# Patient Record
Sex: FEMALE | Race: WHITE | NOT HISPANIC OR LATINO | Employment: FULL TIME | URBAN - METROPOLITAN AREA
[De-identification: names, ages, dates, MRNs, and addresses within clinical notes are randomized per-mention and may not be internally consistent; named-entity substitution may affect disease eponyms.]

---

## 2017-11-02 ENCOUNTER — OFFICE VISIT (OUTPATIENT)
Dept: ORTHOPEDIC SURGERY | Facility: CLINIC | Age: 37
End: 2017-11-02

## 2017-11-02 VITALS
SYSTOLIC BLOOD PRESSURE: 157 MMHG | WEIGHT: 260 LBS | HEIGHT: 69 IN | DIASTOLIC BLOOD PRESSURE: 100 MMHG | HEART RATE: 60 BPM | BODY MASS INDEX: 38.51 KG/M2

## 2017-11-02 DIAGNOSIS — G56.02 CARPAL TUNNEL SYNDROME OF LEFT WRIST: Primary | ICD-10-CM

## 2017-11-02 DIAGNOSIS — G56.21 CUBITAL TUNNEL SYNDROME ON RIGHT: ICD-10-CM

## 2017-11-02 PROCEDURE — 99203 OFFICE O/P NEW LOW 30 MIN: CPT | Performed by: ORTHOPAEDIC SURGERY

## 2017-11-02 RX ORDER — OMEPRAZOLE 20 MG/1
20 CAPSULE, DELAYED RELEASE ORAL DAILY
COMMUNITY

## 2017-11-02 NOTE — PROGRESS NOTES
[facility] [place] [facility]opaedic Surgery Clinic Note    Subjective     Pain of the Right Wrist (Right wrist pain for 2 years, right wrist hurts worse than left, numbness in ring and fifth finger, wrist braces make the pain worse, taking ibuprofen helps.) and Pain of the Left Wrist (Left wrist pain for 2 years, numbness in thumb and index finger, wrist brace makes the pain worse, taking ibuprofen helps./)      SHAN Rodriguez is a 37 y.o. female. Patient has had difficulty with her right ulnar side of the wrist for 2-4 years.  She has numbness and tingling in the small and ring digits.  With regards to her left wrist, she has had difficulty again for about 2 years and is not more on the radial 3 digits.  She's use bracing both at the elbow on the right in the wrist on the left without a lot of improvement.  Her discomfort awakens her at night.  She is here with her mother today for further evaluation and treatment.     History reviewed. No pertinent past medical history.   Past Surgical History:   Procedure Laterality Date   •  SECTION        Family History   Problem Relation Age of Onset   • Hypertension Mother    • Diabetes Mother      Social History     Social History   • Marital status: Single     Spouse name: N/A   • Number of children: N/A   • Years of education: N/A     Occupational History   • Not on file.     Social History Main Topics   • Smoking status: Current Every Day Smoker     Packs/day: 0.50     Types: Cigarettes     Start date:    • Smokeless tobacco: Never Used   • Alcohol use No   • Drug use: No   • Sexual activity: Defer     Other Topics Concern   • Not on file     Social History Narrative   • No narrative on file      No current outpatient prescriptions on file prior to visit.     No current facility-administered medications on file prior to visit.       Allergies   Allergen Reactions   • Latex Itching        The following portions of the patient's history were reviewed and updated as  "appropriate: allergies, current medications, past family history, past medical history, past social history, past surgical history and problem list.    Review of Systems   Constitutional: Negative.    HENT: Negative.    Eyes: Negative.    Respiratory: Negative.    Cardiovascular: Negative.    Gastrointestinal: Negative.    Endocrine: Negative.    Genitourinary: Negative.    Musculoskeletal: Positive for arthralgias.   Skin: Negative.    Allergic/Immunologic: Negative.    Neurological: Negative.    Hematological: Negative.    Psychiatric/Behavioral: Negative.         Objective      Physical Exam  /100  Pulse 60  Ht 68.5\" (174 cm)  Wt 260 lb (118 kg)  BMI 38.95 kg/m2    Body mass index is 38.95 kg/(m^2).    General  Mental Status - alert  General Appearance - cooperative, well groomed, not in acute distress  Orientation - Oriented X3  Build & Nutrition - well developed and well nourished  Posture - normal posture  Gait - normal gait     Integumentary  Global Assessment  Examination of related systems reveals - no lymphadenopathy  General Characteristics  Overall examination of the patient's skin reveals - no rashes, no evidence of scars, no suspicious lesions and no bruises.  Color - normal coloration of skin.    Ortho Exam  Peripheral Vascular   Bilateral Upper Extremity    No cyanotic nail beds    Pink nail beds and rapid capillary refill   Palpation    Radial Pulse - Bilaterally normal    Neurologic   Sensory: Light touch intact- Right and left hand    Left Upper Extremity    Left wrist extensors: 5/5    Left wrist flexors: 5/5    Left intrinsics: 4/5      Right Upper Extremity    Right wrist extensors: 5/5    Right wrist flexors: 5/5    Right intrinsics: 4/5    Musculoskeletal   Left Elbow    Forearm supination: AROM - 90 degrees    Forearm pronation: AROM - 90 degrees   Right Elbow    Forearm supination: AROM - 90 degrees    Forearm pronation: AROM - 90 degrees     Inspection and Palpation   Right " elbow:     Tenderness - medial epicondyle    Swelling - none    Crepitus - none    Muscle tone - no atrophy   Left Wrist    Tenderness - none    Swelling - none    Crepitus - none    Muscle tone - no atrophy     ROJM:   Left Wrist    Flexion: AROM - 90 degrees    Extension: AROM - 90 degrees   Right elbow    Flexion: AROM - 140 degrees    Extension: AROM - 0 degrees     Deformities, Malalignments, Discrepancies    None     Functional Testing   Right elbow:    Positive elbow flexion test, positive Tinel's at the right cubital tunnel   Left Wrist    Tinel's Sign negative    Phalen's Sign equivocal    Carpal Compression Test equivocal    Thenar Wasting none    APB 4 out of 5       Strength and Tone    Right  strength: good    Left  strength: good        Imaging/Studies  None taken today    Assessment:  1. Carpal tunnel syndrome of left wrist    2. Cubital tunnel syndrome on right        Plan:  1. I suggested since the patient has failed a course of splinting self-directed, and because of the duration of her symptoms that we proceed with nerve conduction studies of both upper extremities.  2. Continue over-the-counter medication as needed for discomfort  3. It seems that her right elbow bothers her more than the left wrist.  We will revisit that when she returns to discuss the results of the nerve studies.      Medical Decision Making  Management Options : over-the-counter medicine  Data/Risk: lab tests    Ben Mc MD  11/02/17  6:18 PM

## 2018-09-11 ENCOUNTER — OFFICE VISIT (OUTPATIENT)
Dept: ORTHOPEDIC SURGERY | Facility: CLINIC | Age: 38
End: 2018-09-11

## 2018-09-11 VITALS — OXYGEN SATURATION: 99 % | WEIGHT: 244.71 LBS | BODY MASS INDEX: 36.24 KG/M2 | HEIGHT: 69 IN | HEART RATE: 83 BPM

## 2018-09-11 DIAGNOSIS — G56.21 CUBITAL TUNNEL SYNDROME ON RIGHT: Primary | ICD-10-CM

## 2018-09-11 DIAGNOSIS — M25.531 RIGHT WRIST PAIN: ICD-10-CM

## 2018-09-11 PROCEDURE — 99213 OFFICE O/P EST LOW 20 MIN: CPT | Performed by: ORTHOPAEDIC SURGERY

## 2018-09-11 RX ORDER — ARIPIPRAZOLE 10 MG/1
10 TABLET ORAL DAILY
COMMUNITY

## 2018-09-11 RX ORDER — TOPIRAMATE 50 MG/1
50 TABLET, FILM COATED ORAL 2 TIMES DAILY
COMMUNITY

## 2018-09-11 NOTE — PROGRESS NOTES
"    [facility] [place] Orthopa[facility] Clinic Note    Subjective     CC: Follow-up (10 months - Cubital tunnel syndrome on right )      HPI    Belia Rodriguez is a 38 y.o. female.  Patient is here today for follow-up of her right hand numbness.  She is at the point now where she has continuous numbness and tingling in her ulnar 2 digits.  She has difficulty sleeping.  She has nocturnal disturbance.  She has difficulty gripping things forcefully secondary to weakness.       ROS:    Constiutional:Pt denies fever, chills, nausea, or vomiting.  MSK:as above    Objective      Past Medical History  History reviewed. No pertinent past medical history.      Physical Exam  Pulse 83   Ht 174 cm (68.5\")   Wt 111 kg (244 lb 11.4 oz)   SpO2 99%   BMI 36.66 kg/m²     Body mass index is 36.66 kg/m².    Patient is well nourished and well developed.        Ortho Exam  Patient has tenderness at the medial epicondyle of the elbow  Positive Tinel's at the cubital tunnel  No hyperthenar wasting  Weakness with dorsal interossei testing  No clawing      Assessment:  1. Cubital tunnel syndrome on right    2. Right wrist pain        Plan:  1. Recommend over the counter anti-inflammatories for pain and/or swelling  2. Nerve conduction studies of the right upper extremity we requested  3. Follow-up to review the studies and we will make treatment recommendations based on severity of ulnar nerve compression.  If the nerve is compressed where we think it's compressed, the recommendation will likely be for subcutaneous ulnar nerve transposition.  4. We will notify Dr. Norwood's office and we will like the patient seen as soon as possible.      Medical Decision Making  Management Options : over-the-counter medicine  Data/Risk: CPT medicine tests    Ben Mc MD  09/11/18  6:38 PM  "